# Patient Record
Sex: MALE | Race: OTHER
[De-identification: names, ages, dates, MRNs, and addresses within clinical notes are randomized per-mention and may not be internally consistent; named-entity substitution may affect disease eponyms.]

---
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
## 2020-12-31 ENCOUNTER — TRANSCRIPTION ENCOUNTER (OUTPATIENT)
Age: 20
End: 2020-12-31

## 2022-04-29 ENCOUNTER — EMERGENCY (EMERGENCY)
Facility: HOSPITAL | Age: 22
LOS: 0 days | Discharge: HOME | End: 2022-04-30
Attending: EMERGENCY MEDICINE | Admitting: EMERGENCY MEDICINE
Payer: COMMERCIAL

## 2022-04-29 VITALS
TEMPERATURE: 98 F | DIASTOLIC BLOOD PRESSURE: 64 MMHG | WEIGHT: 139.99 LBS | RESPIRATION RATE: 18 BRPM | HEART RATE: 78 BPM | SYSTOLIC BLOOD PRESSURE: 121 MMHG | OXYGEN SATURATION: 100 %

## 2022-04-29 DIAGNOSIS — R11.0 NAUSEA: ICD-10-CM

## 2022-04-29 DIAGNOSIS — R10.9 UNSPECIFIED ABDOMINAL PAIN: ICD-10-CM

## 2022-04-29 DIAGNOSIS — L03.90 CELLULITIS, UNSPECIFIED: ICD-10-CM

## 2022-04-29 DIAGNOSIS — K42.9 UMBILICAL HERNIA WITHOUT OBSTRUCTION OR GANGRENE: ICD-10-CM

## 2022-04-29 DIAGNOSIS — Z87.19 PERSONAL HISTORY OF OTHER DISEASES OF THE DIGESTIVE SYSTEM: ICD-10-CM

## 2022-04-29 DIAGNOSIS — R10.815 PERIUMBILIC ABDOMINAL TENDERNESS: ICD-10-CM

## 2022-04-29 DIAGNOSIS — Z91.010 ALLERGY TO PEANUTS: ICD-10-CM

## 2022-04-29 PROCEDURE — 99283 EMERGENCY DEPT VISIT LOW MDM: CPT

## 2022-04-29 PROCEDURE — 99285 EMERGENCY DEPT VISIT HI MDM: CPT

## 2022-04-29 RX ORDER — IOHEXOL 300 MG/ML
30 INJECTION, SOLUTION INTRAVENOUS ONCE
Refills: 0 | Status: COMPLETED | OUTPATIENT
Start: 2022-04-29 | End: 2022-04-29

## 2022-04-29 RX ORDER — SODIUM CHLORIDE 9 MG/ML
1000 INJECTION INTRAMUSCULAR; INTRAVENOUS; SUBCUTANEOUS ONCE
Refills: 0 | Status: COMPLETED | OUTPATIENT
Start: 2022-04-29 | End: 2022-04-29

## 2022-04-29 RX ORDER — DIPHENHYDRAMINE HCL 50 MG
50 CAPSULE ORAL ONCE
Refills: 0 | Status: COMPLETED | OUTPATIENT
Start: 2022-04-29 | End: 2022-04-29

## 2022-04-29 NOTE — ED ADULT NURSE NOTE - OBJECTIVE STATEMENT
pt presented to ED c/o sharp epigastric pain worsened while moving. Pt reports pain when palpated. Airway patent No resp distress on assessment. Pt aox4 .

## 2022-04-29 NOTE — ED ADULT NURSE NOTE - NSIMPLEMENTINTERV_GEN_ALL_ED
Implemented All Universal Safety Interventions:  [place] to call system. Call bell, personal items and telephone within reach. Instruct patient to call for assistance. Room bathroom lighting operational. Non-slip footwear when patient is off stretcher. Physically safe environment: no spills, clutter or unnecessary equipment. Stretcher in lowest position, wheels locked, appropriate side rails in place.

## 2022-04-30 LAB
ALBUMIN SERPL ELPH-MCNC: 5 G/DL — SIGNIFICANT CHANGE UP (ref 3.5–5.2)
ALP SERPL-CCNC: 75 U/L — SIGNIFICANT CHANGE UP (ref 30–115)
ALT FLD-CCNC: 18 U/L — SIGNIFICANT CHANGE UP (ref 0–41)
ANION GAP SERPL CALC-SCNC: 12 MMOL/L — SIGNIFICANT CHANGE UP (ref 7–14)
AST SERPL-CCNC: 25 U/L — SIGNIFICANT CHANGE UP (ref 0–41)
BASOPHILS # BLD AUTO: 0.05 K/UL — SIGNIFICANT CHANGE UP (ref 0–0.2)
BASOPHILS NFR BLD AUTO: 0.4 % — SIGNIFICANT CHANGE UP (ref 0–1)
BILIRUB DIRECT SERPL-MCNC: <0.2 MG/DL — SIGNIFICANT CHANGE UP (ref 0–0.3)
BILIRUB INDIRECT FLD-MCNC: >0.1 MG/DL — LOW (ref 0.2–1.2)
BILIRUB SERPL-MCNC: 0.3 MG/DL — SIGNIFICANT CHANGE UP (ref 0.2–1.2)
BUN SERPL-MCNC: 12 MG/DL — SIGNIFICANT CHANGE UP (ref 10–20)
CALCIUM SERPL-MCNC: 9.7 MG/DL — SIGNIFICANT CHANGE UP (ref 8.5–10.1)
CHLORIDE SERPL-SCNC: 108 MMOL/L — SIGNIFICANT CHANGE UP (ref 98–110)
CO2 SERPL-SCNC: 22 MMOL/L — SIGNIFICANT CHANGE UP (ref 17–32)
CREAT SERPL-MCNC: 0.9 MG/DL — SIGNIFICANT CHANGE UP (ref 0.7–1.5)
EGFR: 123 ML/MIN/1.73M2 — SIGNIFICANT CHANGE UP
EOSINOPHIL # BLD AUTO: 0.35 K/UL — SIGNIFICANT CHANGE UP (ref 0–0.7)
EOSINOPHIL NFR BLD AUTO: 3.1 % — SIGNIFICANT CHANGE UP (ref 0–8)
GLUCOSE SERPL-MCNC: 75 MG/DL — SIGNIFICANT CHANGE UP (ref 70–99)
HCT VFR BLD CALC: 45.2 % — SIGNIFICANT CHANGE UP (ref 42–52)
HGB BLD-MCNC: 14.6 G/DL — SIGNIFICANT CHANGE UP (ref 14–18)
IMM GRANULOCYTES NFR BLD AUTO: 0.3 % — SIGNIFICANT CHANGE UP (ref 0.1–0.3)
LIDOCAIN IGE QN: 37 U/L — SIGNIFICANT CHANGE UP (ref 7–60)
LYMPHOCYTES # BLD AUTO: 26.9 % — SIGNIFICANT CHANGE UP (ref 20.5–51.1)
LYMPHOCYTES # BLD AUTO: 3.03 K/UL — SIGNIFICANT CHANGE UP (ref 1.2–3.4)
MCHC RBC-ENTMCNC: 25.1 PG — LOW (ref 27–31)
MCHC RBC-ENTMCNC: 32.3 G/DL — SIGNIFICANT CHANGE UP (ref 32–37)
MCV RBC AUTO: 77.8 FL — LOW (ref 80–94)
MONOCYTES # BLD AUTO: 0.74 K/UL — HIGH (ref 0.1–0.6)
MONOCYTES NFR BLD AUTO: 6.6 % — SIGNIFICANT CHANGE UP (ref 1.7–9.3)
NEUTROPHILS # BLD AUTO: 7.05 K/UL — HIGH (ref 1.4–6.5)
NEUTROPHILS NFR BLD AUTO: 62.7 % — SIGNIFICANT CHANGE UP (ref 42.2–75.2)
NRBC # BLD: 0 /100 WBCS — SIGNIFICANT CHANGE UP (ref 0–0)
PLATELET # BLD AUTO: 274 K/UL — SIGNIFICANT CHANGE UP (ref 130–400)
POTASSIUM SERPL-MCNC: 4.7 MMOL/L — SIGNIFICANT CHANGE UP (ref 3.5–5)
POTASSIUM SERPL-SCNC: 4.7 MMOL/L — SIGNIFICANT CHANGE UP (ref 3.5–5)
PROT SERPL-MCNC: 7.6 G/DL — SIGNIFICANT CHANGE UP (ref 6–8)
RBC # BLD: 5.81 M/UL — SIGNIFICANT CHANGE UP (ref 4.7–6.1)
RBC # FLD: 14.4 % — SIGNIFICANT CHANGE UP (ref 11.5–14.5)
SODIUM SERPL-SCNC: 142 MMOL/L — SIGNIFICANT CHANGE UP (ref 135–146)
WBC # BLD: 11.25 K/UL — HIGH (ref 4.8–10.8)
WBC # FLD AUTO: 11.25 K/UL — HIGH (ref 4.8–10.8)

## 2022-04-30 PROCEDURE — 74177 CT ABD & PELVIS W/CONTRAST: CPT | Mod: 26,MA

## 2022-04-30 RX ORDER — BACITRACIN ZINC 500 UNIT/G
1 OINTMENT IN PACKET (EA) TOPICAL
Qty: 15 | Refills: 0
Start: 2022-04-30 | End: 2022-05-06

## 2022-04-30 RX ORDER — CEPHALEXIN 500 MG
1 CAPSULE ORAL
Qty: 28 | Refills: 0
Start: 2022-04-30 | End: 2022-05-06

## 2022-04-30 RX ADMIN — Medication 102 MILLIGRAM(S): at 00:34

## 2022-04-30 RX ADMIN — SODIUM CHLORIDE 1000 MILLILITER(S): 9 INJECTION INTRAMUSCULAR; INTRAVENOUS; SUBCUTANEOUS at 00:20

## 2022-04-30 RX ADMIN — IOHEXOL 30 MILLILITER(S): 300 INJECTION, SOLUTION INTRAVENOUS at 00:18

## 2022-04-30 NOTE — CONSULT NOTE ADULT - SUBJECTIVE AND OBJECTIVE BOX
GENERAL SURGERY CONSULT NOTE    Patient: NICOLE GONSALEZ , 22y ([birth year])Male   MRN: [id number]  Location: [facility] ED  Visit: 04-29-22 Emergency  Date: 04-30-22 @ 01:18    HPI: 22M with no PMHX presents with 1  day of umbilical pain that started suddenly. The patient came in to the ED for evaluation and was found to have a reducible umbilical hernia on examination. Surgery consulted for a concern for overlying skin changes. On examination, the patient has a reducible umbilical hernia, no overlying skin changes and minimal tenderness. The patient denies any nausea or vomiting, is having gas and bowel movements. No history of abdominal surgeries.      PAST MEDICAL & SURGICAL HISTORY:      Home Medications:        VITALS:  T(F): 97.8 (04-29-22 @ 21:54), Max: 97.8 (04-29-22 @ 21:54)  HR: 78 (04-29-22 @ 21:54) (78 - 78)  BP: 121/64 (04-29-22 @ 21:54) (121/64 - 121/64)  RR: 18 (04-29-22 @ 21:54) (18 - 18)  SpO2: 100% (04-29-22 @ 21:54) (100% - 100%)    PHYSICAL EXAM:  General: NAD, AAOx3, calm and cooperative  HEENT: NCAT, KIMBERLEY, EOMI, Trachea ML, Neck supple  Cardiac: RRR S1, S2, no Murmurs, rubs or gallops  Respiratory: CTAB, normal respiratory effort, breath sounds equal BL, no wheeze, rhonchi or crackles  Abdomen: Reducible umbilical hernia, no overlying skin changes. Soft, non-distended, non-tender, no rebound, no guarding. +BS.  Musculoskeletal: Strength 5/5 BL UE/LE, ROM intact, compartments soft  Neuro: Sensation grossly intact and equal throughout, no focal deficits  Vascular: Pulses 2+ throughout, extremities well perfused  Skin: Warm/dry, normal color, no jaundice      MEDICATIONS  (STANDING):    MEDICATIONS  (PRN):      LAB/STUDIES:                        14.6   11.25 )-----------( 274      ( 29 Apr 2022 23:54 )             45.2     04-29    142  |  108  |  12  ----------------------------<  75  4.7   |  22  |  0.9    Ca    9.7      29 Apr 2022 23:54    TPro  7.6  /  Alb  5.0  /  TBili  0.3  /  DBili  <0.2  /  AST  25  /  ALT  18  /  AlkPhos  75  04-29      LIVER FUNCTIONS - ( 29 Apr 2022 23:54 )  Alb: 5.0 g/dL / Pro: 7.6 g/dL / ALK PHOS: 75 U/L / ALT: 18 U/L / AST: 25 U/L / GGT: x           IMAGING:  n/a    ACCESS DEVICES:  [x ] Peripheral IV     GENERAL SURGERY CONSULT NOTE    Patient: NICOLE GONSALEZ , 22y ([birth year])Male   MRN: [id number]  Location: [facility] ED  Visit: 04-29-22 Emergency  Date: 04-30-22 @ 01:18    HPI: 22M with no PMHX presents with 1  day of umbilical pain that started suddenly. The patient came in to the ED for evaluation and was found to have a reducible umbilical hernia on examination. Surgery consulted for a concern for overlying skin changes. On examination, the patient has a reducible umbilical hernia, no overlying skin changes and minimal tenderness. The patient denies any nausea or vomiting, is having gas and bowel movements. No history of abdominal surgeries.    PAST MEDICAL & SURGICAL HISTORY:  Denies     VITALS:  T(F): 97.8 (04-29-22 @ 21:54), Max: 97.8 (04-29-22 @ 21:54)  HR: 78 (04-29-22 @ 21:54) (78 - 78)  BP: 121/64 (04-29-22 @ 21:54) (121/64 - 121/64)  RR: 18 (04-29-22 @ 21:54) (18 - 18)  SpO2: 100% (04-29-22 @ 21:54) (100% - 100%)    PHYSICAL EXAM:  General: NAD, AAOx3, calm and cooperative  HEENT: NCAT, KIMBERLEY, EOMI, Trachea ML, Neck supple  Cardiac: RRR S1, S2, no Murmurs, rubs or gallops  Respiratory: CTAB, normal respiratory effort, breath sounds equal BL, no wheeze, rhonchi or crackles  Abdomen: Reducible umbilical hernia, no overlying skin changes. Soft, non-distended, non-tender, no rebound, no guarding. +BS.  Musculoskeletal: Strength 5/5 BL UE/LE, ROM intact, compartments soft  Neuro: Sensation grossly intact and equal throughout, no focal deficits  Vascular: Pulses 2+ throughout, extremities well perfused  Skin: Warm/dry, normal color, no jaundice    LAB/STUDIES:                        14.6   11.25 )-----------( 274      ( 29 Apr 2022 23:54 )             45.2     04-29    142  |  108  |  12  ----------------------------<  75  4.7   |  22  |  0.9    Ca    9.7      29 Apr 2022 23:54    TPro  7.6  /  Alb  5.0  /  TBili  0.3  /  DBili  <0.2  /  AST  25  /  ALT  18  /  AlkPhos  75  04-29    LIVER FUNCTIONS - ( 29 Apr 2022 23:54 )  Alb: 5.0 g/dL / Pro: 7.6 g/dL / ALK PHOS: 75 U/L / ALT: 18 U/L / AST: 25 U/L / GGT: x           IMAGING:  n/a    ACCESS DEVICES:  [x ] Peripheral IV

## 2022-04-30 NOTE — CONSULT NOTE ADULT - ATTENDING COMMENTS
ACS Attending Note Attestation    Patient is examined and evaluated at the bedside with the residents/PAs. Treatment plan discussed with the team, nurses, and consulting physicians and consulting teams. Medications, radiological studies and all other relevant studies reviewed. I reviewed the resident/PA note and agreed with above assessment and plan with following additions and corrections.    NICOLE GONSALEZ Patient is a 22y old  Male who presents with a chief complaint of dominic umbilical pain due to reducible umbilical hernia    Allergies  No Known Drug Allergies  peanuts (Urticaria)  Intolerances    PAST MEDICAL & SURGICAL HISTORY:    Vital Signs Last 24 Hrs  T(C): 36.6 (29 Apr 2022 21:54), Max: 36.6 (29 Apr 2022 21:54)  T(F): 97.8 (29 Apr 2022 21:54), Max: 97.8 (29 Apr 2022 21:54)  HR: 78 (29 Apr 2022 21:54) (78 - 78)  BP: 121/64 (29 Apr 2022 21:54) (121/64 - 121/64)  BP(mean): --  RR: 18 (29 Apr 2022 21:54) (18 - 18)  SpO2: 100% (29 Apr 2022 21:54) (100% - 100%)                        14.6   11.25 )-----------( 274      ( 29 Apr 2022 23:54 )             45.2     04-29    142  |  108  |  12  ----------------------------<  75  4.7   |  22  |  0.9    Ca    9.7      29 Apr 2022 23:54    TPro  7.6  /  Alb  5.0  /  TBili  0.3  /  DBili  <0.2  /  AST  25  /  ALT  18  /  AlkPhos  75  04-29    Diagnosis: Umbilical hernia    Plan:	  - outpatient follow up for elective hernia repair  - supportive care  - GI/DVT prophylaxis  - pain management  - follow up consults  - repeat studies as needed    Amira Fang MD, FACS  Trauma/ACS/Surcical Critical care Attending

## 2022-04-30 NOTE — ED PROVIDER NOTE - CLINICAL SUMMARY MEDICAL DECISION MAKING FREE TEXT BOX
patient remained stable in ED, improved well, results of the diagnostic studies reviewed and discussed with patient. Surgery consult obtained, CT findings discussed with surgery on call, recommended Keflex and to follow up as outpatient. Patient remained awake, alert, ambulatory and comfortable, tolerated PO. Discussed with patient in detail about the need for close outpatient follow up and the need to return to ED for any persistent, or worsening symptoms, for any new symptoms/concerns. patient verbalized understanding and agreed. patient is given detail aftercare instructions and is instructed well to f/u as outpatient for further care.

## 2022-04-30 NOTE — ED PROVIDER NOTE - CARE PLAN
Principal Discharge DX:	Cellulitis of skin  Secondary Diagnosis:	Umbilical hernia  Secondary Diagnosis:	Abdominal pain   1

## 2022-04-30 NOTE — ED PROVIDER NOTE - PHYSICAL EXAMINATION
Abdominal exam: +generalized periumbilical tenderness, +superolateral part of the umbilical has excoriated skin, +small, tender and reducible umbilical hernia.

## 2022-04-30 NOTE — CONSULT NOTE ADULT - ASSESSMENT
ASSESSMENT:  22yM w/ no PMHx of  who presented with reducible umbilical hernia, no overlying skin changes and no obstructive symptoms. Physical exam findings, imaging, and labs as documented above.     PLAN:  -The patient does not require a CT scan as there is minimal benefit to the study as the patient has a reducible hernia with no overlying skin changes.  -Can be discharged to home with instructions to follow up with Dr. Fang as an outpatient, call [phone] to schedule the appointment.  -Patient advised to return to the ED if hernia recurs, if he develops severe umbilical pain or develops nausea or vomiting.         Above plan discussed with Attending Surgeon Dr. Fang , patient, patient family, and Primary team  04-30-22 @ 01:18   ASSESSMENT:  22yM w/ no PMHx of  who presented with reducible umbilical hernia, no overlying skin changes and no obstructive symptoms. Physical exam findings, imaging, and labs as documented above.     PLAN:  -The patient does not require a CT scan as there is minimal benefit to the study as the patient has a reducible hernia with no overlying skin changes.  -Can be discharged to home with instructions to follow up with Dr. Fang as an outpatient, call [phone] to schedule the appointment.  -Patient advised to return to the ED if hernia recurs, if he develops severe umbilical pain or develops nausea or vomiting.   -Patient and plan discussed with Dr. Fang

## 2022-04-30 NOTE — ED PROVIDER NOTE - PATIENT PORTAL LINK FT
You can access the FollowMyHealth Patient Portal offered by [facility] by registering at the following website: [email or web address]. By joining [employer]’s FollowMyHealth portal, you will also be able to view your health information using other applications (apps) compatible with our system.

## 2022-04-30 NOTE — ED PROVIDER NOTE - OBJECTIVE STATEMENT
Patient states that, has h/o constipation, and sometimes had to strain to move bowels. Patient started having severe sharp abdominal pain in and around the umbilical area today around 3 pm while moving the bowels, associated with nausea. Patient attempted checking to see why he has the pain, noted ?hernia, thought something may have been in the umbilical area and causing pain. Attempted irrigating with jet water hose, initially severe pain improved, but pain in and around the umbilicus continued, so had decided to come to ED. Denies f/c/vomiting/cp/sob/ symptoms. No trauma.

## 2022-04-30 NOTE — ED PROVIDER NOTE - NSFOLLOWUPINSTRUCTIONS_ED_ALL_ED_FT
Abdominal Pain    Many things can cause abdominal pain. Usually, abdominal pain is not caused by a disease and will improve without treatment. Your health care provider will do a physical exam and possibly order blood tests and imaging to help determine the seriousness of your pain. However, in many cases, no cause may be found and you may need further testing as an outpatient. Monitor your abdominal pain for any changes.     SEEK IMMEDIATE MEDICAL CARE IF YOU HAVE THE FOLLOWING SYMPTOMS: worsening abdominal pain, vomiting, diarrhea, inability to have bowel movements or pass gas, black or bloody stool, fever accompanying chest pain or back pain, or dizziness/lightheadedness.    Cellulitis    Cellulitis is a skin infection caused by bacteria. This condition occurs most often in the arms and lower legs but can occur anywhere over the body. Symptoms include redness, swelling, warm skin, tenderness, and chills/fever. If you were prescribed an antibiotic medicine, take it as told by your health care provider. Do not stop taking the antibiotic even if you start to feel better.    SEEK IMMEDIATE MEDICAL CARE IF YOU HAVE THE FOLLOWING SYMPTOMS: worsening fever, red streaks coming from affected area, vomiting or diarrhea, or dizziness/lightheadedness.   Umbilical Hernia    WHAT YOU NEED TO KNOW:    An umbilical hernia is a bulge through the abdominal wall near your umbilicus (belly button). The hernia may contain tissue from the abdomen, part of an organ (such as the intestine), or fluid. Umbilical Hernia         DISCHARGE INSTRUCTIONS:    Return to the emergency department if:     Your hernia gets bigger, feels firm, or turns blue or purple.       You have severe abdominal pain with nausea or vomiting.       You stop having bowel movements and passing gas.      You have blood in your bowel movement.     Contact your healthcare provider if:     You have a fever.       You have nausea or are vomiting.      You are constipated.       You have questions or concerns about your condition or care.    Self-care:     Drink more liquids. Liquids may prevent constipation and straining during a bowel movement. This can prevent your hernia from getting bigger. Ask how much liquid you should drink each day and which liquids are best for you.       Eat high-fiber foods. Fiber may prevent constipation and straining during a bowel movement. This can prevent your hernia from getting bigger. Foods that contain fiber include fruits, vegetables, legumes, and whole grains.       Avoid heavy lifting. Heavy lifting can put pressure on your hernia and make it bigger. Ask your healthcare provider how much is safe to lift.       Do not place anything over your umbilical hernia. Do not place tape or a coin over the hernia. This treatment does not help treat a hernia.     Follow up with your healthcare provider as directed: You may need to see a surgeon to plan for hernia repair. Write down your questions so you remember to ask them during your visits.        © Copyright [employer] 2019 All illustrations and images included in CareNotes are the copyrighted property of [employer] or [employer]. Please call [phone] to make an appointment with surgeon.     Abdominal Pain    Many things can cause abdominal pain. Usually, abdominal pain is not caused by a disease and will improve without treatment. Your health care provider will do a physical exam and possibly order blood tests and imaging to help determine the seriousness of your pain. However, in many cases, no cause may be found and you may need further testing as an outpatient. Monitor your abdominal pain for any changes.     SEEK IMMEDIATE MEDICAL CARE IF YOU HAVE THE FOLLOWING SYMPTOMS: worsening abdominal pain, vomiting, diarrhea, inability to have bowel movements or pass gas, black or bloody stool, fever accompanying chest pain or back pain, or dizziness/lightheadedness.    Cellulitis    Cellulitis is a skin infection caused by bacteria. This condition occurs most often in the arms and lower legs but can occur anywhere over the body. Symptoms include redness, swelling, warm skin, tenderness, and chills/fever. If you were prescribed an antibiotic medicine, take it as told by your health care provider. Do not stop taking the antibiotic even if you start to feel better.    SEEK IMMEDIATE MEDICAL CARE IF YOU HAVE THE FOLLOWING SYMPTOMS: worsening fever, red streaks coming from affected area, vomiting or diarrhea, or dizziness/lightheadedness.   Umbilical Hernia    WHAT YOU NEED TO KNOW:    An umbilical hernia is a bulge through the abdominal wall near your umbilicus (belly button). The hernia may contain tissue from the abdomen, part of an organ (such as the intestine), or fluid. Umbilical Hernia         DISCHARGE INSTRUCTIONS:    Return to the emergency department if:     Your hernia gets bigger, feels firm, or turns blue or purple.       You have severe abdominal pain with nausea or vomiting.       You stop having bowel movements and passing gas.      You have blood in your bowel movement.     Contact your healthcare provider if:     You have a fever.       You have nausea or are vomiting.      You are constipated.       You have questions or concerns about your condition or care.    Self-care:     Drink more liquids. Liquids may prevent constipation and straining during a bowel movement. This can prevent your hernia from getting bigger. Ask how much liquid you should drink each day and which liquids are best for you.       Eat high-fiber foods. Fiber may prevent constipation and straining during a bowel movement. This can prevent your hernia from getting bigger. Foods that contain fiber include fruits, vegetables, legumes, and whole grains.       Avoid heavy lifting. Heavy lifting can put pressure on your hernia and make it bigger. Ask your healthcare provider how much is safe to lift.       Do not place anything over your umbilical hernia. Do not place tape or a coin over the hernia. This treatment does not help treat a hernia.     Follow up with your healthcare provider as directed: You may need to see a surgeon to plan for hernia repair. Write down your questions so you remember to ask them during your visits.        © Copyright [employer] 2019 All illustrations and images included in CareNotes are the copyrighted property of [employer] or [employer].

## 2022-04-30 NOTE — ED PROVIDER NOTE - CARE PROVIDER_API CALL
Amira Fang)  Surgery; Surgical Critical Care  [address] [place] [address]  Phone: [phone]  Fax: [phone]  Follow Up Time: Urgent

## 2022-06-02 ENCOUNTER — APPOINTMENT (OUTPATIENT)
Dept: SURGERY | Facility: CLINIC | Age: 22
End: 2022-06-02

## 2022-09-22 ENCOUNTER — APPOINTMENT (OUTPATIENT)
Dept: SURGERY | Facility: CLINIC | Age: 22
End: 2022-09-22

## 2023-02-07 ENCOUNTER — EMERGENCY (EMERGENCY)
Facility: HOSPITAL | Age: 23
LOS: 0 days | Discharge: HOME | End: 2023-02-07
Attending: EMERGENCY MEDICINE
Payer: COMMERCIAL

## 2023-02-07 VITALS
WEIGHT: 130.07 LBS | HEART RATE: 87 BPM | HEIGHT: 61 IN | TEMPERATURE: 98 F | RESPIRATION RATE: 20 BRPM | SYSTOLIC BLOOD PRESSURE: 120 MMHG | DIASTOLIC BLOOD PRESSURE: 64 MMHG | OXYGEN SATURATION: 98 %

## 2023-02-07 DIAGNOSIS — R11.2 NAUSEA WITH VOMITING, UNSPECIFIED: ICD-10-CM

## 2023-02-07 DIAGNOSIS — R19.7 DIARRHEA, UNSPECIFIED: ICD-10-CM

## 2023-02-07 DIAGNOSIS — Z91.010 ALLERGY TO PEANUTS: ICD-10-CM

## 2023-02-07 PROCEDURE — 99284 EMERGENCY DEPT VISIT MOD MDM: CPT

## 2023-02-07 PROCEDURE — 99283 EMERGENCY DEPT VISIT LOW MDM: CPT

## 2023-02-07 RX ORDER — ONDANSETRON 8 MG/1
4 TABLET, FILM COATED ORAL ONCE
Refills: 0 | Status: COMPLETED | OUTPATIENT
Start: 2023-02-07 | End: 2023-02-07

## 2023-02-07 RX ORDER — ONDANSETRON 8 MG/1
1 TABLET, FILM COATED ORAL
Qty: 18 | Refills: 0
Start: 2023-02-07 | End: 2023-02-09

## 2023-02-07 RX ORDER — ONDANSETRON 8 MG/1
1 TABLET, FILM COATED ORAL
Qty: 15 | Refills: 0
Start: 2023-02-07 | End: 2023-02-11

## 2023-02-07 RX ADMIN — ONDANSETRON 4 MILLIGRAM(S): 8 TABLET, FILM COATED ORAL at 12:51

## 2023-02-07 NOTE — ED PROVIDER NOTE - PATIENT PORTAL LINK FT
You can access the FollowMyHealth Patient Portal offered by [facility] by registering at the following website: [email or web address]. By joining [employer]’s FollowMyHealth portal, you will also be able to view your health information using other applications (apps) compatible with our system.

## 2023-02-07 NOTE — ED PROVIDER NOTE - OBJECTIVE STATEMENT
23 y M no pmhx c/o nvd since 12am. per pt and parents; pt had a turkey sandwhich from [employer] yesterday and not much else to eat. ~12am pt had profuse vomiting ~5-6 times and several episodes of diarrhea. pt endorsed scant blood on vomitus 1x but not again. denies f/sob/cp/back pain/leg pain

## 2023-02-07 NOTE — ED PROVIDER NOTE - PROGRESS NOTE DETAILS
s/p zofran; pt feels well. The patient / caregiver given detailed return precautions and advised to return to the emergency department if any new symptoms developed, symptoms worsened or for any concerns. The patient / caregiver offered the opportunity to ask questions and verbalized that they understand the diagnosis and discharge instructions.

## 2023-02-07 NOTE — ED PROVIDER NOTE - ATTENDING CONTRIBUTION TO CARE
23-year-old male, previously healthy, presents with vomiting and diarrhea since midnight, shortly after having a cold turkey sandwich from [employer]. At one point had a spot of blood but resolved thereafter. Denies abdominal pain, fever, chest pain, shortness of breath, and all of the symptoms. Denies past intestinal issues. Smokes marijuana regularly. On exam, afebrile, hemodynamically stable, saturating well on room air, NAD, well appearing, sitting comfortably in bed, no WOB, speaking full sentences, head NCAT, EOMI grossly, anicteric, MMM, no JVD, RRR, nml S1/S2, no m/r/g, lungs CTAB, no w/r/r, abd soft, NT, ND, nml BS, no rebound or guarding, AAO, CN's 3-12 grossly intact, MIKE spontaneously, no leg cyanosis or edema, skin warm, well perfused, no rashes or hives. Abdomen entirely benign, with low suspicion for acute intra-abdominal process to warrant abdominal imaging. No evidence of pneumothorax/Booerhaave's clinically to warrant chest imaging. Patient appears well-hydrated and has no symptoms to suggest acute electrolyte abnormalities and tolerated p.o. well here following oral Zofran, and does not warrant labs at this time. Offered lab/IV to patient and agrees at this time with oral intake alone. Patient is well appearing, NAD, afebrile, hemodynamically stable. Discharged with instructions in further symptomatic care, strict return precautions.

## 2023-02-07 NOTE — ED PROVIDER NOTE - TOBACCO USE
Sent med list to [facility] for upcoming appt. Current Outpatient Medications:     ProAir HFA 90 mcg/actuation inhaler, Take 2 Puffs by inhalation every four (4) hours as needed for Wheezing or Shortness of Breath. With spacer. (Patient not taking: Reported on 1/23/2023), Disp: 1 Each, Rfl: 2    acetaminophen (TYLENOL) 325 mg tablet, Take  by mouth every four (4) hours as needed for Pain. (Patient not taking: Reported on 1/23/2023), Disp: , Rfl:     ibuprofen 200 mg cap, Take  by mouth as needed.  (Patient not taking: Reported on 1/23/2023), Disp: , Rfl:
Never smoker

## 2025-03-10 NOTE — ED ADULT NURSE NOTE - OBJECTIVE STATEMENT
[de-identified] : GENERAL APPEARANCE: Well nourished and hydrated, pleasant, alert, and oriented x 3. Appears their stated age. HEENT: Normocephalic, extraocular eye motion intact. Nasal septum midline. Oral cavity clear. External auditory canal clear. RESPIRATORY: Breath sounds clear and audible in all lobes. No wheezing, No accessory muscle use. CARDIOVASCULAR: No apparent abnormalities. No lower leg edema. No varicosities. Pedal pulses are palpable. NEUROLOGIC: Sensation is normal, no muscle weakness in the upper or lower extremities. DERMATOLOGIC: No apparent skin lesions, moist, warm, no rash. SPINE: Cervical spine appears normal and moves freely; thoracic spine appears normal and moves freely; lumbosacral spine appears normal and moves freely, normal, nontender. MUSCULOSKELETAL: Hands, wrists, and elbows are normal and move freely, shoulders are normal and move freely. PSYCHIATRIC: Oriented to person, place, and time, insight and judgement were intact and the affect was normal. DTRs: Biceps, brachioradialis, triceps, patellar, ankle and plantar 2+ and symmetric bilaterally. Pulses: dorsalis pedis, posterior tibial, femoral, popliteal, and radial 2+ and symmetric bilaterally. Constitutional: Alert and in no acute distress, but well-appearing. [de-identified] : antalgic . Right knee exam shows moderate effusion, ROM is 5-1 10 degrees, and no instability, no pain with King, medial and lateral joint line tenderness.  Left knee exam shows mild effusion, ROM is 5-1 20 degrees, no instability, no pain with King, medial and lateral joint line tenderness. 5/5 motor strength in bilateral lower extremities. Sensory: Intact in bilateral lower extremities. DTRs: Biceps, brachioradialis, triceps, patellar, ankle and plantar 2+ and symmetric bilaterally. Pulses: dorsalis pedis, posterior tibial, femoral, popliteal, and radial 2+ and symmetric bilaterally pt c/o nausea and vomiting - thinks he has food poisoning from turkey sandwich from [employer]